# Patient Record
Sex: MALE | Race: WHITE | NOT HISPANIC OR LATINO | URBAN - METROPOLITAN AREA
[De-identification: names, ages, dates, MRNs, and addresses within clinical notes are randomized per-mention and may not be internally consistent; named-entity substitution may affect disease eponyms.]

---

## 2021-02-05 ENCOUNTER — EVALUATION (OUTPATIENT)
Dept: PHYSICAL THERAPY | Facility: REHABILITATION | Age: 41
End: 2021-02-05
Payer: COMMERCIAL

## 2021-02-05 DIAGNOSIS — M25.552 LEFT HIP PAIN: Primary | ICD-10-CM

## 2021-02-05 PROCEDURE — 97162 PT EVAL MOD COMPLEX 30 MIN: CPT | Performed by: PHYSICAL THERAPIST

## 2021-02-05 PROCEDURE — 97110 THERAPEUTIC EXERCISES: CPT | Performed by: PHYSICAL THERAPIST

## 2021-02-05 RX ORDER — GABAPENTIN 600 MG/1
600 TABLET ORAL 3 TIMES DAILY
COMMUNITY

## 2021-02-05 RX ORDER — MORPHINE SULFATE 15 MG/1
15 TABLET ORAL EVERY 4 HOURS PRN
COMMUNITY

## 2021-02-05 NOTE — PROGRESS NOTES
PT Evaluation     Today's date: 2021  Patient name: Cate Johnson  : 1980  MRN: [id number]  Referring provider: Pastora Barker MD  Dx:   Encounter Diagnosis     ICD-10-CM    1  Left hip pain  M25 552        Start Time: 1340  Stop Time: 1420  Total time in clinic (min): 40 minutes    Assessment  Assessment details: Pt is a 37 yo male with AVN of the left hip presenting with limited ROM and mildly impaired strength  He would benefit from an exercise program focusing on hip mobility and strength of the external rotators  He will be undergoing surgery in March and would benefit from improved mobility prior to surgery  He has a history of lumbar fusion and does present with dural tension on the left as well and this will need to be considered in rehab  Impairments: abnormal gait, abnormal or restricted ROM, activity intolerance, lacks appropriate home exercise program and pain with function    Symptom irritability: moderateUnderstanding of Dx/Px/POC: excellent  Goals  STG: in 4 weeks  PT is performing HEP consistently  Increase flexibility 10%  LTG: by discharge  SLR to 60 degrees B  Normal hip flexor mobility    Plan  Plan details: 1x/week every 2 weeks  Patient would benefit from: skilled physical therapy  Referral necessary: No  Planned therapy interventions: stretching, strengthening, therapeutic exercise and home exercise program  Frequency: 1x week        Subjective Evaluation    History of Present Illness  Mechanism of injury: Last spring he began to have hip pain  By summer he was limping  By late summer he had to go to the ER due to pain  By the third ER visit they xrayed the hip and diagnosed him with OA/AVN  He was advised to do 2-3 visits of PT and then he will be undergoing surgery in March      Pain  Current pain rating: 3  At best pain ratin  At worst pain ratin  Location: left groin      Diagnostic Tests  X-ray: abnormal (avn)  Patient Goals  Patient goals for therapy: return to work, decreased pain and increased strength  Patient goal: Walk through the snow, get back to physical activity, walk as a         Objective     Lumbar Screen  Lumbar range of motion within normal limits with the following exceptions: Mod limit in all directions    Active Range of Motion     Additional Active Range of Motion Details  Grossly equal to PROM    Passive Range of Motion   Left Hip   Flexion: 90 degrees with pain  Abduction: 25 degrees   External rotation (90/90): 25 degrees   Internal rotation (90/90): 25 degrees     Right Hip   Flexion: 105 degrees   Abduction: 35 degrees   External rotation (90/90): 35 degrees   Internal rotation (90/90): 25 degrees     Strength/Myotome Testing     Left Hip   Planes of Motion   Flexion: 4+  Extension: 5  Abduction: 5  Adduction: 4+  External rotation: 3+  Internal rotation: 4    Right Hip   Planes of Motion   Flexion: 4+  Extension: 5  Abduction: 5  Adduction: 4+  External rotation: 5  Internal rotation: 5    Tests     Left Hip   Positive FADIR  Conor: Positive  90/90 SLR: Positive  Right Hip   Conor: Positive  90/90 SLR: Positive  Additional Tests Details  SLR to 40 degrees  Prone knee flex to 100 degrees             Precautions: lumbar fusion      Manuals 2/5                                                                Neuro Re-Ed                                                                                                        Ther Ex             Clam shell 2x10 TB           Prone quad stretch 30"x3            Piriformis stretch 30"x3            Single k-c 10"x10            Seated ham stretch 30"x3            Conor stretch             bridges             planks             Ther Activity                                       Gait Training                                       Modalities                                         Access Code: [email or web address]   URL: [email or web address]   Date: 02/05/2021   Prepared by: Fernanda Aase Tews     Exercises  Prone Quadriceps Stretch with Strap - 3 reps - 1 sets - 30 hold - 2x daily - 7x weekly  Seated Hamstring Stretch - 3 reps - 1 sets - 30 hold - 2x daily - 7x weekly  Supine Single Knee to Chest Stretch - 10 reps - 1 sets - 10 hold - 2x daily - 7x weekly  Supine Piriformis Stretch - 3 reps - 30 hold - 2x daily - 7x weekly  Clamshell - 10 reps - 3 sets - 1x daily - 7x weekly

## 2021-02-15 ENCOUNTER — OFFICE VISIT (OUTPATIENT)
Dept: PHYSICAL THERAPY | Facility: REHABILITATION | Age: 41
End: 2021-02-15
Payer: COMMERCIAL

## 2021-02-15 DIAGNOSIS — M25.552 LEFT HIP PAIN: Primary | ICD-10-CM

## 2021-02-15 PROCEDURE — 97110 THERAPEUTIC EXERCISES: CPT

## 2021-02-15 NOTE — PROGRESS NOTES
Daily Note     Today's date: 2/15/2021  Patient name: Palak Martins  : 1980  MRN: [id number]  Referring provider: Magdaleno Langley MD  Dx:   Encounter Diagnosis     ICD-10-CM    1  Left hip pain  M25 552                   Subjective: Hip remains painful  He has been doing HEP      Objective: See treatment diary below      Assessment: Tolerated treatment well        Plan:   He feels comfortable with cont ex at home till surgery     Precautions: lumbar fusion      Manuals  2-15                                                               Neuro Re-Ed                                                                                                        Ther Ex             Clam shell 2x10 PTB 5" x10           Prone quad stretch 30"x3 30"x 3           Piriformis stretch 30"x3 30"x3           Single k-c 10"x10 10" x10           Seated ham stretch 30"x3 30" x3           Conor stretch  30"x3           bridges  w PTB abd 5" x10           planks  10" x5           rosy ADD  10"x10                                                               Ther Activity                                       Gait Training                                       Modalities

## 2021-03-31 ENCOUNTER — OFFICE VISIT (OUTPATIENT)
Dept: PHYSICAL THERAPY | Facility: REHABILITATION | Age: 41
End: 2021-03-31
Payer: COMMERCIAL

## 2021-03-31 DIAGNOSIS — M25.552 LEFT HIP PAIN: ICD-10-CM

## 2021-03-31 DIAGNOSIS — Z96.642 STATUS POST LEFT HIP REPLACEMENT: Primary | ICD-10-CM

## 2021-03-31 PROCEDURE — 97110 THERAPEUTIC EXERCISES: CPT | Performed by: PHYSICAL THERAPIST

## 2021-03-31 PROCEDURE — 97162 PT EVAL MOD COMPLEX 30 MIN: CPT | Performed by: PHYSICAL THERAPIST

## 2021-03-31 NOTE — PROGRESS NOTES
PT Evaluation     Today's date: 2021  Patient name: Jina Yañez  : 1980  MRN: [id number]  Referring provider: Raz Lopez MD  Dx:   Encounter Diagnosis     ICD-10-CM    1  Status post left hip replacement  X81 256    2  Left hip pain  M25 552        Start Time: 0900  Stop Time: 0950  Total time in clinic (min): 50 minutes    Assessment  Assessment details: Pt had AVN of the left hip prior to surgery  He underwent LINH on 3/16/21  He presents 2 weeks post op with limited hip ROM, pain over the incision, numbness over the hip and weakness consistent with post op status  He is a  and will be walking 1/2 of his route when he RTW  He would benefit from physical therapy for ROM and strengthening to restore his functional mobility      Impairments: abnormal gait, abnormal or restricted ROM, impaired physical strength, lacks appropriate home exercise program, pain with function and weight-bearing intolerance    Symptom irritability: highUnderstanding of Dx/Px/POC: excellent  Goals  STG: in 4 weeks  ROM L=R  Strength 3+/5  Walk without assistive device  LTG: by discharge  RTW  Resume fitness program  Walk for 3 miles without increased pain    Plan  Patient would benefit from: skilled physical therapy  Referral necessary: No  Planned therapy interventions: manual therapy, neuromuscular re-education, patient education, stretching, therapeutic exercise and home exercise program  Frequency: 2x week  Duration in weeks: 10  Treatment plan discussed with: patient        Subjective Evaluation    History of Present Illness  Date of surgery: 3/16/2021  Mechanism of injury: surgery  Quality of life: excellent    Pain  Current pain ratin  At best pain ratin  At worst pain ratin  Location: anterior thigh above patella, anterior hip, lat shin  Relieving factors: rest  Aggravating factors: standing and walking    Social Support  Steps to enter house: yes  Stairs in house: yes   Lives in: multiple-level home    Patient Goals  Patient goals for therapy: return to work  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, resume working out        Objective     Lumbar Screen  Lumbar range of motion within normal limits with the following exceptions:Limitation:  Flex: gil  Ext: gil  SB: mod B    Neurological Testing     Sensation     Hip   Left Hip   Diminished: light touch    Comments   Left light touch: over lat hip        Active Range of Motion   Left Hip   Flexion: 50 degrees   Abduction: 20 degrees   External rotation (90/90): 18 degrees   Internal rotation (90/90): 20 degrees     Right Hip   Abduction: 30 degrees     Passive Range of Motion   Left Hip   Flexion: 80 degrees with pain  Extension: 0 degrees   Abduction: 20 degrees   External rotation (90/90): 18 degrees   Internal rotation (90/90): 20 degrees     Strength/Myotome Testing     Additional Strength Details  Strength at least 3/5 in all directions except flexion 2/5    General Comments:      Hip Comments   Gait: Pt is amb with a SPC with slightly decreased stance time on the left and decreased hip extension      Flowsheet Rows      Most Recent Value   PT/OT G-Codes   Current Score  63   Projected Score  87             Precautions: Anterior approach TKA      Manuals 3/31            Foam roller anterior thigh NT                                                   Neuro Re-Ed                                                                 Ther Ex             Bike             Glut sets 5"x10            Quad sets 5"x10            Heel slides x10            SL abd x10            Prone knee flex x10            HR/TR x10ea            LAQ             Side steps                                       Ther Activity                                       Gait Training                                       Modalities

## 2021-03-31 NOTE — LETTER
2021    Rozena Cogan, MD [address] [facility]    Patient: Byron Berrios   YOB: 1980   Date of Visit: 3/31/2021     Encounter Diagnosis     ICD-10-CM    1  Status post total left knee replacement  Z96 652    2  Left hip pain  M25 552        Dear Dr Renee Pandya: Thank you for your recent referral of Byron Berrios  Please review the attached evaluation summary from [facility] recent visit  Please verify that you agree with the plan of care by signing the attached order  If you have any questions or concerns, please do not hesitate to call  I sincerely appreciate the opportunity to share in the care of one of your patients and hope to have another opportunity to work with you in the near future  Sincerely,    Shalonda Cao, PT      Referring Provider:      I certify that I have read the below Plan of Care and certify the need for these services furnished under this plan of treatment while under my care  Rozena Cogan, MD [address] [place] [id number]  Via Fax: [phone]          PT Evaluation     Today's date: 3/31/2021  Patient name: Byron Berrios  : 1980  MRN: [id number]  Referring provider: Kim Marlow MD  Dx:   Encounter Diagnosis     ICD-10-CM    1  Status post total left knee replacement  Z96 652                   Assessment  Assessment details: Pt had AVN of the left hip prior to surgery  He underwent LINH on 3/16/21  He presents 2 weeks post op with limited hip ROM, pain over the incision, numbness over the hip and weakness consistent with post op status  He is a  and will be walking 1/2 of his route when he RTW  He would benefit from physical therapy for ROM and strengthening to restore his functional mobility      Impairments: abnormal gait, abnormal or restricted ROM, impaired physical strength, lacks appropriate home exercise program, pain with function and weight-bearing intolerance    Symptom irritability: highUnderstanding of Dx/Px/POC: excellent  Goals  STG: in 4 weeks  ROM L=R  Strength 3+/5  Walk without assistive device  LTG: by discharge  RTW  Resume fitness program  Walk for 3 miles without increased pain    Plan  Patient would benefit from: skilled physical therapy  Referral necessary: No  Planned therapy interventions: manual therapy, neuromuscular re-education, patient education, stretching, therapeutic exercise and home exercise program  Frequency: 2x week  Duration in weeks: 10  Treatment plan discussed with: patient        Subjective Evaluation    History of Present Illness  Date of surgery: 3/16/2021  Mechanism of injury: surgery  Quality of life: excellent    Pain  Current pain ratin  At best pain ratin  At worst pain ratin  Location: anterior thigh above patella, anterior hip, lat shin  Relieving factors: rest  Aggravating factors: standing and walking    Social Support  Steps to enter house: yes  Stairs in house: yes   Lives in: multiple-level home    Patient Goals  Patient goals for therapy: return to work  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, resume working out        Objective     Lumbar Screen  Lumbar range of motion within normal limits with the following exceptions:Limitation:  Flex: gil  Ext: gil  SB: mod B    Neurological Testing     Sensation     Hip   Left Hip   Diminished: light touch    Comments   Left light touch: over lat hip        Active Range of Motion   Left Hip   Flexion: 50 degrees   Abduction: 20 degrees   External rotation (90/90): 18 degrees   Internal rotation (90/90): 20 degrees     Right Hip   Abduction: 30 degrees     Passive Range of Motion   Left Hip   Flexion: 80 degrees with pain  Extension: 0 degrees   Abduction: 20 degrees   External rotation (90/90): 18 degrees   Internal rotation (90/90): 20 degrees     Strength/Myotome Testing     Additional Strength Details  Strength at least 3/5 in all directions except flexion 2/5    General Comments:      Hip Comments   Gait: Pt is amb with a SPC with slightly decreased stance time on the left and decreased hip extension      Flowsheet Rows      Most Recent Value   PT/OT G-Codes   Current Score  63   Projected Score  87             Precautions: Anterior approach TKA      Manuals 3/31            Foam roller anterior thigh NT                                                   Neuro Re-Ed                                                                 Ther Ex             Bike             Glut sets 5"x10            Quad sets 5"x10            Heel slides x10            SL abd x10            Prone knee flex x10            HR/TR x10ea            LAQ             Side steps                                       Ther Activity                                       Gait Training                                       Modalities

## 2021-04-01 ENCOUNTER — TRANSCRIBE ORDERS (OUTPATIENT)
Dept: PHYSICAL THERAPY | Facility: REHABILITATION | Age: 41
End: 2021-04-01

## 2021-04-01 DIAGNOSIS — Z96.652 STATUS POST TOTAL LEFT KNEE REPLACEMENT: Primary | ICD-10-CM

## 2021-04-05 ENCOUNTER — OFFICE VISIT (OUTPATIENT)
Dept: PHYSICAL THERAPY | Facility: REHABILITATION | Age: 41
End: 2021-04-05
Payer: COMMERCIAL

## 2021-04-05 DIAGNOSIS — M25.552 LEFT HIP PAIN: ICD-10-CM

## 2021-04-05 DIAGNOSIS — Z96.652 STATUS POST TOTAL LEFT KNEE REPLACEMENT: Primary | ICD-10-CM

## 2021-04-05 PROCEDURE — 97112 NEUROMUSCULAR REEDUCATION: CPT

## 2021-04-05 PROCEDURE — 97110 THERAPEUTIC EXERCISES: CPT

## 2021-04-05 NOTE — PROGRESS NOTES
Daily Note     Today's date: 2021  Patient name: Denny Soler  : 1980  MRN: [id number]  Referring provider: Janet Suero MD  Dx:   Encounter Diagnosis     ICD-10-CM    1  Status post total left knee replacement  Z96 652    2  Left hip pain  M25 552                   Subjective: Patient reports feeling good today no new complaints at this time  Objective: See treatment diary below      Assessment: Patient is progressing well, reports fatigue but no increased pain during or post tx  Plan: Continue per plan of care        Precautions: Anterior approach TKA      Manuals 3/31 4/5           Foam roller anterior thigh NT HS                                                  Neuro Re-Ed                                                                 Ther Ex             Bike  8'           Glut sets 5"x10            Quad sets 5"x10 5"2x10           Heel slides x10            SL abd x10 2x10           Prone knee flex x10            HR/TR x10ea 2x10 ea           LAQ  1# 5" 2x10           Side steps             SLR  2x10           march  x20           Ther Activity                                       Gait Training                                       Modalities

## 2021-04-07 ENCOUNTER — OFFICE VISIT (OUTPATIENT)
Dept: PHYSICAL THERAPY | Facility: REHABILITATION | Age: 41
End: 2021-04-07
Payer: COMMERCIAL

## 2021-04-07 DIAGNOSIS — M25.552 LEFT HIP PAIN: Primary | ICD-10-CM

## 2021-04-07 DIAGNOSIS — Z96.642 S/P HIP REPLACEMENT, LEFT: ICD-10-CM

## 2021-04-07 PROCEDURE — 97140 MANUAL THERAPY 1/> REGIONS: CPT | Performed by: PHYSICAL THERAPIST

## 2021-04-07 PROCEDURE — 97110 THERAPEUTIC EXERCISES: CPT | Performed by: PHYSICAL THERAPIST

## 2021-04-07 NOTE — PROGRESS NOTES
Daily Note     Today's date: 2021  Patient name: Shayy Chua  : 1980  MRN: [id number]  Referring provider: Myron Peters MD  Dx:   Encounter Diagnosis     ICD-10-CM    1  Left hip pain  M25 552    2  S/P hip replacement, left  Z96 642        Start Time: 1115  Stop Time: 1200  Total time in clinic (min): 45 minutes    Subjective: Pt states that he has had hip pain for so long he automatically limps when he gets OOB  Feeling pretty good  Objective: See treatment diary below      Assessment: Patient is progressing well  Mildly decreased stance on left but he is not using an assistive device  ROM restrictions persist        Plan: Continue per plan of care        Precautions: Anterior approach TKA      Manuals 3/31 4/5 4/7          Foam roller anterior thigh NT HS NT          Hip PROM   NT                                    Neuro Re-Ed                                                    Ther Ex             / x          Bike  8' 10'          Glut sets 5"x10  bridge x20          Quad sets 5"x10 5"2x10           Heel slides x10            SL abd x10 2x10 2x10          Prone knee flex x10            HR/TR x10ea 2x10 ea 2x10 ea          LAQ  1# 5" 2x10 3# 5"x20          Side steps   10 ft 5 laps          SLR  2x10 2x10          march   x20          clamshell                                       Gait Training                                       Modalities

## 2021-04-13 ENCOUNTER — OFFICE VISIT (OUTPATIENT)
Dept: PHYSICAL THERAPY | Facility: REHABILITATION | Age: 41
End: 2021-04-13
Payer: COMMERCIAL

## 2021-04-13 DIAGNOSIS — Z96.642 STATUS POST TOTAL REPLACEMENT OF LEFT HIP: ICD-10-CM

## 2021-04-13 DIAGNOSIS — M25.552 LEFT HIP PAIN: Primary | ICD-10-CM

## 2021-04-13 PROCEDURE — 97140 MANUAL THERAPY 1/> REGIONS: CPT

## 2021-04-13 PROCEDURE — 97110 THERAPEUTIC EXERCISES: CPT

## 2021-04-13 NOTE — PROGRESS NOTES
Daily Note     Today's date: 2021  Patient name: Silvano Ramos  : 1980  MRN: [id number]  Referring provider: Yvan Hollins MD  Dx:   Encounter Diagnosis     ICD-10-CM    1  Left hip pain  M25 552    2  Status post total left knee replacement  Z96 652                   Subjective: Pt states that he has had hip pain for so long he automatically limps when he gets OOB  Feeling pretty good  Objective: See treatment diary below      Assessment: Patient is progressing well  Increased HS tightness present on LLE with self stretching encouragement during his HEP  Pt overall is doing well with no complaints of pain  Added in a few exercises with muscular fatigue and weakness present but overall is doing well  Plan: Continue per plan of care        Precautions: Anterior approach LINH      Manuals 3/31 4/5 4/7 4/13         Foam roller anterior thigh NT HS NT MM         Hip PROM   NT MM                                   Neuro Re-Ed                                                    Ther Ex             / march   20x 20x         Bike  8' 10' 10'         Glut sets 5"x10  bridge x20 bridge x20         Quad sets 5"x10 5"2x10  5" x20         Heel slides x10            SL abd x10 2x10 2x10 2x10         Prone knee flex x10            HR/TR x10ea 2x10 ea 2x10 ea 2x10 ea         LAQ  1# 5" 2x10 3# 5"x20 3# 5"x20         Side steps   10 ft 5 laps 20' 3 laps         SLR  2x10 2x10 2x10         march   x20 x20         clamshell                                       Gait Training                                       Modalities

## 2021-04-14 ENCOUNTER — OFFICE VISIT (OUTPATIENT)
Dept: PHYSICAL THERAPY | Facility: REHABILITATION | Age: 41
End: 2021-04-14
Payer: COMMERCIAL

## 2021-04-14 DIAGNOSIS — M25.552 LEFT HIP PAIN: Primary | ICD-10-CM

## 2021-04-14 DIAGNOSIS — Z96.642 STATUS POST TOTAL REPLACEMENT OF LEFT HIP: ICD-10-CM

## 2021-04-14 DIAGNOSIS — Z96.642 S/P HIP REPLACEMENT, LEFT: ICD-10-CM

## 2021-04-14 PROCEDURE — 97110 THERAPEUTIC EXERCISES: CPT | Performed by: PHYSICAL THERAPIST

## 2021-04-14 PROCEDURE — 97140 MANUAL THERAPY 1/> REGIONS: CPT | Performed by: PHYSICAL THERAPIST

## 2021-04-14 NOTE — PROGRESS NOTES
Daily Note     Today's date: 2021  Patient name: Katie Ingram  : 1980  MRN: [id number]  Referring provider: Jennifer Montiel MD  Dx:   Encounter Diagnosis     ICD-10-CM    1  Left hip pain  M25 552    2  Status post total replacement of left hip  Z96 642    3  S/P hip replacement, left  Z96 642                   Subjective: Pt is doing light yard work and notes that he has been feeling good after therapy and work at home  Objective: See treatment diary below      Assessment: Incision is well healed with minor hypertrophy  Reviewed scar massage with him  Hip ext is 10 degrees and does not cause discomfort  Plan: Continue per plan of care        Precautions: Anterior approach LINH      Manuals 3/31 4/5 4/7 4/13 4/14        Foam roller anterior thigh NT HS NT MM NT        Hip PROM   NT MM NT                                  Neuro Re-Ed                                                    Ther Ex             / x 20x x20        Bike  8' 10' 10' 10' L2 TM       Glut sets 5"x10  bridge x20 bridge x20 x30        Quad sets 5"x10 5"2x10  5" x20 D/C        Heel slides x10            SL abd x10 2x10 2x10 2x10 1#x20        Prone knee flex x10            HR/TR x10ea 2x10 ea 2x10 ea 2x10 ea Rockerboard AP/ML x30 & bal 1'        LAQ  1# 5" 2x10 3# 5"x20 3# 5"x20 5#x20        Side steps   10 ft 5 laps 20' 3 laps Pink TB 20'x3 laps        SLR  2x10 2x10 2x10 1#x20        march   x20 x20         clamshell     5"x20        Step ups                          Gait Training     3 min                                  Modalities

## 2021-04-19 ENCOUNTER — OFFICE VISIT (OUTPATIENT)
Dept: PHYSICAL THERAPY | Facility: REHABILITATION | Age: 41
End: 2021-04-19
Payer: COMMERCIAL

## 2021-04-19 DIAGNOSIS — Z96.642 STATUS POST TOTAL REPLACEMENT OF LEFT HIP: ICD-10-CM

## 2021-04-19 DIAGNOSIS — M25.552 LEFT HIP PAIN: Primary | ICD-10-CM

## 2021-04-19 PROCEDURE — 97112 NEUROMUSCULAR REEDUCATION: CPT

## 2021-04-19 PROCEDURE — 97110 THERAPEUTIC EXERCISES: CPT

## 2021-04-19 PROCEDURE — 97140 MANUAL THERAPY 1/> REGIONS: CPT

## 2021-04-19 NOTE — PROGRESS NOTES
Daily Note     Today's date: 2021  Patient name: Daril Soulier  : 1980  MRN: [id number]  Referring provider: Brook Mata MD  Dx:   Encounter Diagnosis     ICD-10-CM    1  Left hip pain  M25 552    2  Status post total replacement of left hip  Z96 642                   Subjective:   Patient reports that his hip is feeling good, the incision bothers him the most because of the tightness and soreness  Patient states that he has been doing stairs normally at home without any issues  Objective: See treatment diary below      Assessment: Tolerated treatment well  Patient exhibited good technique with therapeutic exercises and would benefit from continued PT  Provided cues to improve core recruitment and control during ex performed   + L hip IR and hip extension tightness noted which improved post manual therapy  Patient was able to demonstrate improved gait fluency and hip extension during ambulation post treatment      Plan: Continue per plan of care  Progress treatment as tolerated         Precautions: Anterior approach LINH      Manuals 3/31 4/5 4/7 4/13 4/14 4/19       Foam roller anterior thigh NT HS NT MM NT CC       Hip PROM   NT MM NT CC                                 Neuro Re-Ed                                                    Ther Ex             TA / x 20x x20 x20       Bike  8' 10' 10' 10' L2 TM 2 0 x5' bike x5'       Glut sets 5"x10  bridge x20 bridge x20 x30 bridge 5" x20       Quad sets 5"x10 5"2x10  5" x20 D/C -       Heel slides x10            SL abd x10 2x10 2x10 2x10 1#x20 1# x20       Prone knee flex x10            HR/TR x10ea 2x10 ea 2x10 ea 2x10 ea Rockerboard AP/ML x30 & bal 1' Rockerb A/P,M/L x30  Bal x1'       LAQ  1# 5" 2x10 3# 5"x20 3# 5"x20 5#x20 5# x20       Side steps   10 ft 5 laps 20' 3 laps Pink TB 20'x3 laps Pink tb 20'x4 laps       SLR  2x10 2x10 2x10 1#x20 1# x20       march   x20 x20         clamshell     5"x20 5" x20       Step ups      8" x20 Gait Training     3 min                                  Modalities

## 2024-04-05 ENCOUNTER — OFFICE VISIT (OUTPATIENT)
Dept: URGENT CARE | Age: 44
End: 2024-04-05
Payer: COMMERCIAL

## 2024-04-05 VITALS
TEMPERATURE: 97.4 F | DIASTOLIC BLOOD PRESSURE: 85 MMHG | RESPIRATION RATE: 18 BRPM | HEART RATE: 92 BPM | OXYGEN SATURATION: 96 % | SYSTOLIC BLOOD PRESSURE: 145 MMHG | WEIGHT: 199 LBS

## 2024-04-05 DIAGNOSIS — J01.40 ACUTE PANSINUSITIS, RECURRENCE NOT SPECIFIED: Primary | ICD-10-CM

## 2024-04-05 PROCEDURE — 99214 OFFICE O/P EST MOD 30 MIN: CPT | Performed by: EMERGENCY MEDICINE

## 2024-04-05 RX ORDER — AMOXICILLIN AND CLAVULANATE POTASSIUM 875; 125 MG/1; MG/1
1 TABLET, FILM COATED ORAL 2 TIMES DAILY
Qty: 14 TABLET | Refills: 0 | Status: SHIPPED | OUTPATIENT
Start: 2024-04-05 | End: 2024-04-12

## 2024-04-05 RX ORDER — CYCLOBENZAPRINE HCL 5 MG
5-10 TABLET ORAL 3 TIMES DAILY PRN
COMMUNITY

## 2024-04-05 RX ORDER — PREDNISONE 20 MG/1
60 TABLET ORAL DAILY
Qty: 15 TABLET | Refills: 0 | Status: SHIPPED | OUTPATIENT
Start: 2024-04-05 | End: 2024-04-10

## 2024-04-05 NOTE — PROGRESS NOTES
[facility]        NAME: Cory Dunbar is a 43 y.o. male  : 1980    MRN: [id number]  DATE: 2024  TIME: 1:36 PM    Assessment and Plan   Acute pansinusitis, recurrence not specified [J01.40]  1. Acute pansinusitis, recurrence not specified  amoxicillin-clavulanate (AUGMENTIN) 875-125 mg per tablet    predniSONE 20 mg tablet            Patient Instructions       Follow up with PCP in 3-5 days.  Proceed to  ER if symptoms worsen.    If tests have been performed at [facility], our office will contact you with results if changes need to be made to the care plan discussed with you at the visit.  [facility] on [facility]    Chief Complaint     Chief Complaint   Patient presents with    Sinusitis     Cough, yellow/white mucous, sinus pressure, was using affrin. Sx since 3/27.          History of Present Illness       43-year-old male presents with chief complaint of worsening bilateral frontal maxillary sinus pressure, mucopurulent nasal discharge and generalized malaise and fatigue.  Patient states that symptoms began 2 weeks ago and have been waxing and waning before becoming acutely worse within the last 2 to 3 days.  He denies any chest pain, shortness of breath, visual or speech changes or focal numbness tingling or weakness in his extremities.  Denies headache.  States he has been trialing Afrin with some relief.      Sinusitis  This is a new problem. The current episode started 1 to 4 weeks ago. The problem has been gradually worsening since onset. There has been no fever. His pain is at a severity of 5/10. The pain is moderate. Associated symptoms include chills, congestion, coughing, sinus pressure, sneezing and a sore throat. Pertinent negatives include no diaphoresis, ear pain, headaches, hoarse voice, neck pain, shortness of breath or swollen glands. Past treatments include nasal decongestants. The treatment provided mild relief.       Review of Systems    Review of Systems   Constitutional:  Positive for chills. Negative for activity change, appetite change, diaphoresis, fatigue and fever.   HENT:  Positive for congestion, postnasal drip, rhinorrhea, sinus pressure, sinus pain, sneezing and sore throat. Negative for dental problem, drooling, ear discharge, ear pain, facial swelling, hearing loss, hoarse voice, mouth sores, nosebleeds, tinnitus, trouble swallowing and voice change.    Eyes:  Negative for photophobia, pain, discharge, redness, itching and visual disturbance.   Respiratory:  Positive for cough. Negative for apnea, choking, chest tightness, shortness of breath, wheezing and stridor.    Cardiovascular:  Negative for chest pain, palpitations and leg swelling.   Gastrointestinal:  Negative for abdominal distention, abdominal pain, anal bleeding, blood in stool, constipation, diarrhea, nausea, rectal pain and vomiting.   Genitourinary:  Negative for difficulty urinating, dysuria, enuresis, flank pain, frequency, genital sores and hematuria.   Musculoskeletal:  Negative for arthralgias, back pain, gait problem, joint swelling, myalgias, neck pain and neck stiffness.   Skin:  Negative for color change and rash.   Neurological:  Negative for dizziness, tremors, seizures, syncope, facial asymmetry, speech difficulty, weakness, light-headedness, numbness and headaches.   All other systems reviewed and are negative.        Current Medications       Current Outpatient Medications:     amoxicillin-clavulanate (AUGMENTIN) 875-125 mg per tablet, Take 1 tablet by mouth 2 (two) times a day for 7 days, Disp: 14 tablet, Rfl: 0    cyclobenzaprine (FLEXERIL) 5 mg tablet, Take 5-10 mg by mouth Three times daily as needed, Disp: , Rfl:     gabapentin (NEURONTIN) 600 MG tablet, Take 600 mg by mouth 3 (three) times a day, Disp: , Rfl:     morphine (MSIR) 15 mg tablet, Take 15 mg by mouth every 4 (four) hours as needed for severe pain, Disp: , Rfl:     predniSONE 20 mg tablet,  Take 3 tablets (60 mg total) by mouth daily for 5 days, Disp: 15 tablet, Rfl: 0    Current Allergies     Allergies as of 04/05/2024    (No Known Allergies)            The following portions of the patient's history were reviewed and updated as appropriate: allergies, current medications, past family history, past medical history, past social history, past surgical history and problem list.     No past medical history on file.    Past Surgical History:   Procedure Laterality Date    LUMBAR FUSION N/A        No family history on file.      Medications have been verified.        Objective   /85   Pulse 92   Temp (!) 97.4 °F (36.3 °C) (Tympanic)   Resp 18   Wt 90.3 kg (199 lb)   SpO2 96%   No LMP for male patient.       Physical Exam     Physical Exam  Vitals and nursing note reviewed.   Constitutional:       General: He is not in acute distress.     Appearance: Normal appearance. He is normal weight. He is not ill-appearing, toxic-appearing or diaphoretic.   HENT:      Head: Normocephalic and atraumatic.      Right Ear: Tympanic membrane, ear canal and external ear normal. There is no impacted cerumen.      Left Ear: Tympanic membrane, ear canal and external ear normal. There is no impacted cerumen.      Nose: Mucosal edema, congestion and rhinorrhea present. No nasal deformity, septal deviation, signs of injury, laceration or nasal tenderness.      Right Nostril: No foreign body, epistaxis, septal hematoma or occlusion.      Left Nostril: No foreign body, epistaxis, septal hematoma or occlusion.      Right Turbinates: Enlarged and swollen. Not pale.      Left Turbinates: Enlarged and swollen. Not pale.      Right Sinus: Maxillary sinus tenderness and frontal sinus tenderness present.      Left Sinus: Maxillary sinus tenderness and frontal sinus tenderness present.      Mouth/Throat:      Mouth: Mucous membranes are moist.      Pharynx: Oropharynx is clear. Posterior oropharyngeal erythema present. No  oropharyngeal exudate.   Eyes:      General:         Right eye: No discharge.      Extraocular Movements: Extraocular movements intact.      Pupils: Pupils are equal, round, and reactive to light.   Neck:      Vascular: No carotid bruit.   Cardiovascular:      Rate and Rhythm: Normal rate and regular rhythm.      Pulses: Normal pulses.      Heart sounds: Normal heart sounds.   Pulmonary:      Effort: Pulmonary effort is normal. No respiratory distress.      Breath sounds: Normal breath sounds. No stridor. No wheezing, rhonchi or rales.   Chest:      Chest wall: No tenderness.   Abdominal:      General: Abdomen is flat. There is no distension.      Palpations: There is no mass.      Tenderness: There is no abdominal tenderness. There is no right CVA tenderness, left CVA tenderness, guarding or rebound.      Hernia: No hernia is present.   Musculoskeletal:         General: No swelling, tenderness, deformity or signs of injury.      Cervical back: Normal range of motion and neck supple. No rigidity or tenderness.      Right lower leg: No edema.      Left lower leg: No edema.   Lymphadenopathy:      Cervical: No cervical adenopathy.   Skin:     General: Skin is warm and dry.      Capillary Refill: Capillary refill takes less than 2 seconds.      Coloration: Skin is not jaundiced or pale.      Findings: No bruising, erythema, lesion or rash.   Neurological:      General: No focal deficit present.      Mental Status: He is alert and oriented to person, place, and time.      Cranial Nerves: No cranial nerve deficit.      Sensory: No sensory deficit.      Motor: No weakness.      Coordination: Coordination normal.      Gait: Gait normal.   Psychiatric:         Mood and Affect: Mood normal.         Behavior: Behavior normal.